# Patient Record
Sex: MALE | Race: WHITE | Employment: FULL TIME | URBAN - METROPOLITAN AREA
[De-identification: names, ages, dates, MRNs, and addresses within clinical notes are randomized per-mention and may not be internally consistent; named-entity substitution may affect disease eponyms.]

---

## 2024-09-01 ENCOUNTER — APPOINTMENT (OUTPATIENT)
Dept: GENERAL RADIOLOGY | Age: 19
End: 2024-09-01
Payer: COMMERCIAL

## 2024-09-01 ENCOUNTER — HOSPITAL ENCOUNTER (EMERGENCY)
Age: 19
Discharge: HOME | End: 2024-09-01
Payer: COMMERCIAL

## 2024-09-01 VITALS
RESPIRATION RATE: 18 BRPM | WEIGHT: 190 LBS | OXYGEN SATURATION: 97 % | HEART RATE: 77 BPM | SYSTOLIC BLOOD PRESSURE: 128 MMHG | TEMPERATURE: 97.9 F | DIASTOLIC BLOOD PRESSURE: 76 MMHG

## 2024-09-01 DIAGNOSIS — S61.210A LACERATION OF RIGHT INDEX FINGER WITHOUT FOREIGN BODY WITHOUT DAMAGE TO NAIL, INITIAL ENCOUNTER: Primary | ICD-10-CM

## 2024-09-01 PROCEDURE — 99283 EMERGENCY DEPT VISIT LOW MDM: CPT

## 2024-09-01 PROCEDURE — 90471 IMMUNIZATION ADMIN: CPT | Performed by: PHYSICIAN ASSISTANT

## 2024-09-01 PROCEDURE — 12001 RPR S/N/AX/GEN/TRNK 2.5CM/<: CPT

## 2024-09-01 PROCEDURE — 90714 TD VACC NO PRESV 7 YRS+ IM: CPT | Performed by: PHYSICIAN ASSISTANT

## 2024-09-01 PROCEDURE — 6360000002 HC RX W HCPCS: Performed by: PHYSICIAN ASSISTANT

## 2024-09-01 PROCEDURE — 73140 X-RAY EXAM OF FINGER(S): CPT

## 2024-09-01 RX ADMIN — CLOSTRIDIUM TETANI TOXOID ANTIGEN (FORMALDEHYDE INACTIVATED) AND CORYNEBACTERIUM DIPHTHERIAE TOXOID ANTIGEN (FORMALDEHYDE INACTIVATED) 0.5 ML: 5; 2 INJECTION, SUSPENSION INTRAMUSCULAR at 18:02

## 2024-09-01 NOTE — DISCHARGE INSTRUCTIONS
Keep area clean may gently wash do not soak sutures to be removed in approximately 10 days May return to ER for removal.  Keep covered while at work use Tylenol Motrin for pain

## 2024-09-01 NOTE — ED PROVIDER NOTES
in usual sterile fashion  Exploration:     Hemostasis achieved with:  Epinephrine    Wound exploration: wound explored through full range of motion and entire depth of wound visualized      Wound extent: no foreign bodies/material noted and no tendon damage noted      Contaminated: Some superglue noted to the skin but the wound is clear.    Treatment:     Area cleansed with:  Povidone-iodine and saline    Amount of cleaning:  Standard    Debridement:  None  Skin repair:     Repair method:  Sutures    Suture size:  4-0    Suture material:  Nylon    Suture technique:  Simple interrupted    Number of sutures:  4  Approximation:     Approximation:  Close  Post-procedure details:     Dressing:  Antibiotic ointment and bulky dressing    Procedure completion:  Tolerated well, no immediate complications      Orders Placed This Encounter   Procedures    LACERATION REPAIR    XR FINGER RIGHT (MIN 2 VIEWS)        Medications given during this emergency department visit:  Medications   tetanus & diphtheria toxoids (adult) 5-2 LFU injection 0.5 mL (0.5 mLs IntraMUSCular Given 9/1/24 1802)       There are no discharge medications for this patient.       No past medical history on file.     No past surgical history on file.     Social History     Socioeconomic History    Marital status: Single        There are no discharge medications for this patient.       Results for orders placed or performed during the hospital encounter of 09/01/24   XR FINGER RIGHT (MIN 2 VIEWS)    Narrative    EXAMINATION: XR FINGER RIGHT (MIN 2 VIEWS) 9/1/2024 5:09 PM    ACCESSION NUMBER: [id number]    COMPARISON: None available    INDICATION: laceration from metal    TECHNIQUE: 3 views of the right second digit were obtained.      Impression    FINDINGS/IMPRESSION:  No acute fracture or dislocation. Mild soft tissue swelling is seen. Tiny  radiopaque opacities are seen on the lateral aspect of the proximal phalanx.                Electronically signed

## 2024-09-01 NOTE — ED NOTES
Pt c/o laceration to the right index finger today.  States that he was working on his car and caught his finger on a car part.  Bleeding controlled at the present time

## 2024-09-01 NOTE — ED TRIAGE NOTES
Sliced right index finger in engine bay 2 days ago. Pt states he put superglue in it but it reopened today. Pt has bleeding controlled in triage. Pt denies knowing of last tetanus shot.

## 2025-03-03 ENCOUNTER — APPOINTMENT (OUTPATIENT)
Dept: GENERAL RADIOLOGY | Age: 20
End: 2025-03-03
Payer: COMMERCIAL

## 2025-03-03 ENCOUNTER — HOSPITAL ENCOUNTER (EMERGENCY)
Age: 20
Discharge: HOME | End: 2025-03-03
Payer: COMMERCIAL

## 2025-03-03 VITALS
SYSTOLIC BLOOD PRESSURE: 142 MMHG | BODY MASS INDEX: 31.5 KG/M2 | TEMPERATURE: 98.1 F | DIASTOLIC BLOOD PRESSURE: 70 MMHG | OXYGEN SATURATION: 98 % | HEIGHT: 70 IN | RESPIRATION RATE: 18 BRPM | WEIGHT: 220 LBS | HEART RATE: 81 BPM

## 2025-03-03 DIAGNOSIS — M25.561 ACUTE PAIN OF RIGHT KNEE: ICD-10-CM

## 2025-03-03 DIAGNOSIS — M79.604 RIGHT LEG PAIN: Primary | ICD-10-CM

## 2025-03-03 PROCEDURE — 73562 X-RAY EXAM OF KNEE 3: CPT

## 2025-03-03 PROCEDURE — 73552 X-RAY EXAM OF FEMUR 2/>: CPT

## 2025-03-03 PROCEDURE — 99283 EMERGENCY DEPT VISIT LOW MDM: CPT

## 2025-03-03 ASSESSMENT — LIFESTYLE VARIABLES
HOW MANY STANDARD DRINKS CONTAINING ALCOHOL DO YOU HAVE ON A TYPICAL DAY: 3 OR 4
HOW OFTEN DO YOU HAVE A DRINK CONTAINING ALCOHOL: MONTHLY OR LESS

## 2025-03-03 ASSESSMENT — PAIN DESCRIPTION - LOCATION: LOCATION: LEG

## 2025-03-03 ASSESSMENT — PAIN DESCRIPTION - ORIENTATION: ORIENTATION: RIGHT

## 2025-03-03 ASSESSMENT — PAIN - FUNCTIONAL ASSESSMENT: PAIN_FUNCTIONAL_ASSESSMENT: 0-10

## 2025-03-03 ASSESSMENT — PAIN SCALES - GENERAL: PAINLEVEL_OUTOF10: 8

## 2025-03-03 NOTE — DISCHARGE INSTRUCTIONS
Your x-rays today are normal.  As we discussed, it is possible that you injured your meniscus or ligament in your knee.  Apply an ice pack to your knee for 10 or 15 minutes every few hours.  Elevate your leg when at rest.  Take Tylenol or ibuprofen if needed.  If pain is not improving, please schedule an appointment with orthopedics.  Return to the emergency department if you develop any new, worsening, or concerning symptoms.
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
